# Patient Record
Sex: MALE | Race: WHITE | NOT HISPANIC OR LATINO | Employment: OTHER | URBAN - METROPOLITAN AREA
[De-identification: names, ages, dates, MRNs, and addresses within clinical notes are randomized per-mention and may not be internally consistent; named-entity substitution may affect disease eponyms.]

---

## 2018-04-19 ENCOUNTER — ESTABLISHED COMPREHENSIVE EXAM (OUTPATIENT)
Dept: URBAN - METROPOLITAN AREA CLINIC 43 | Facility: CLINIC | Age: 43
End: 2018-04-19

## 2018-04-19 DIAGNOSIS — H52.13: ICD-10-CM

## 2018-04-19 DIAGNOSIS — H52.203: ICD-10-CM

## 2018-04-19 PROCEDURE — 92014 COMPRE OPH EXAM EST PT 1/>: CPT

## 2018-04-19 PROCEDURE — 92015 DETERMINE REFRACTIVE STATE: CPT

## 2018-04-19 PROCEDURE — 92310-1 LEVEL 1 CONTACT LENS MANAGEMENT

## 2018-04-19 ASSESSMENT — TONOMETRY
OD_IOP_MMHG: 9
OS_IOP_MMHG: 10

## 2018-04-19 ASSESSMENT — VISUAL ACUITY
OD_SC: J1@3"
OS_SC: 5/200
OS_CC: 20/25
OS_SC: J1@3"
OD_CC: 20/25
OD_SC: 5/200

## 2020-06-02 ENCOUNTER — EST. PATIENT EMERGENCY (OUTPATIENT)
Dept: URBAN - METROPOLITAN AREA CLINIC 43 | Facility: CLINIC | Age: 45
End: 2020-06-02

## 2020-06-02 DIAGNOSIS — H10.45: ICD-10-CM

## 2020-06-02 PROCEDURE — 92012 INTRM OPH EXAM EST PATIENT: CPT

## 2020-06-02 RX ORDER — BUTALBITAL, ASPIRIN, CAFFEINE AND CODEINE PHOSPHATE 30; 50; 40; 325 MG/1; MG/1; MG/1; MG/1: 1 CAPSULE ORAL TWICE A DAY

## 2020-06-02 ASSESSMENT — TONOMETRY
OD_IOP_MMHG: 10
OS_IOP_MMHG: 10

## 2020-06-02 ASSESSMENT — VISUAL ACUITY
OD_CC: 20/25
OS_CC: 20/20-2

## 2020-07-22 ENCOUNTER — ESTABLISHED COMPREHENSIVE EXAM (OUTPATIENT)
Dept: URBAN - METROPOLITAN AREA CLINIC 43 | Facility: CLINIC | Age: 45
End: 2020-07-22

## 2020-07-22 DIAGNOSIS — Z97.3: ICD-10-CM

## 2020-07-22 DIAGNOSIS — H52.13: ICD-10-CM

## 2020-07-22 DIAGNOSIS — H52.203: ICD-10-CM

## 2020-07-22 PROCEDURE — 92015 DETERMINE REFRACTIVE STATE: CPT

## 2020-07-22 PROCEDURE — 92310-1 LEVEL 1 CONTACT LENS MANAGEMENT

## 2020-07-22 PROCEDURE — 92014 COMPRE OPH EXAM EST PT 1/>: CPT

## 2020-07-22 ASSESSMENT — VISUAL ACUITY
OS_SC: J1 @4"
OD_CC: 20/25
OS_SC: 5/200
OS_CC: 20/25
OD_SC: 5/200

## 2020-07-22 ASSESSMENT — TONOMETRY
OD_IOP_MMHG: 10
OS_IOP_MMHG: 10

## 2023-01-18 ENCOUNTER — COMPREHENSIVE EXAM (OUTPATIENT)
Dept: URBAN - METROPOLITAN AREA CLINIC 43 | Facility: CLINIC | Age: 48
End: 2023-01-18

## 2023-01-18 DIAGNOSIS — Z97.3: ICD-10-CM

## 2023-01-18 DIAGNOSIS — H52.13: ICD-10-CM

## 2023-01-18 DIAGNOSIS — H52.203: ICD-10-CM

## 2023-01-18 PROCEDURE — 92015 DETERMINE REFRACTIVE STATE: CPT

## 2023-01-18 PROCEDURE — 92310-2 LEVEL 2 CONTACT LENS MANAGEMENT

## 2023-01-18 PROCEDURE — 92014 COMPRE OPH EXAM EST PT 1/>: CPT

## 2023-01-18 ASSESSMENT — VISUAL ACUITY
OD_SC: CF 4FT
OD_SC: >J12
OS_SC: CF 3FT
OU_CC: 20/20-1
OD_CC: 20/30 GLASSES
OS_SC: >J12
OS_CC: 20/25-2

## 2023-01-18 ASSESSMENT — TONOMETRY
OD_IOP_MMHG: 16
OS_IOP_MMHG: 16

## 2025-04-24 ENCOUNTER — COMPREHENSIVE EXAM (OUTPATIENT)
Age: 50
End: 2025-04-24

## 2025-04-24 DIAGNOSIS — H52.13: ICD-10-CM

## 2025-04-24 DIAGNOSIS — H52.203: ICD-10-CM

## 2025-04-24 DIAGNOSIS — Z97.3: ICD-10-CM

## 2025-04-24 PROCEDURE — 92015 DETERMINE REFRACTIVE STATE: CPT

## 2025-04-24 PROCEDURE — 92310-2 LEVEL 2 SOFT LENS UPDATE

## 2025-04-24 PROCEDURE — 92014 COMPRE OPH EXAM EST PT 1/>: CPT
